# Patient Record
(demographics unavailable — no encounter records)

---

## 2025-05-21 NOTE — HISTORY OF PRESENT ILLNESS
[de-identified] : CC: This is a very pleasant 30-year-old male that presents to the office today as a new patient with right shoulder pain. Patient saw my colleague for this complaint in March 2025.  He was diagnosed with subacromial impingement of the right shoulder.  Advised to start physical therapy was prescribed meloxicam.  Has been taking meloxicam for the last couple of months until he ran out a week or 2 ago and noticed marked improvement.  He states that after he ran out he still noticed improvement and is about 65% better however is not fully recovered.  There is no inciting event or trauma.  He states that he was doing [employer] doing a lot of overhead activities but there was not 1 inciting event.  He has been doing the home exercise program he was provided.

## 2025-05-21 NOTE — PHYSICAL EXAM
[de-identified] : XR of Date: [default] Indication: [default] Views: [3-AP, scapular Y, axillary]  Performed at [facility]: [Yes]  Impression: 3 views of the right shoulder were obtained today and showed no fracture, or dislocation.  Humeral head is appropriately centered without subluxation or superior migration. There is well-preserved glenohumeral and AC joint space.  No calcific tendinosis.   The radiographs discussed were ordered and read by me during this patient's visit.  I reviewed each radiograph detail with the patient discussed the findings highlighted in the Impression.  [de-identified] : RIGHT SHOULDER   INSPECTION Appearance: No overlying skin changes, warmth, swelling, or muscular atrophy  PALPATION-TENDERNESS Anterior Capsule: None Posterior Capsule: None AC Joint/Clavicle: None Bicipital groove: None Supraspinatus: None Infraspinatus: None Teres Minor: None LH Biceps: None  ROM Active Flexion: Full-170 degrees Passive Flexion: Full-170 degrees Abduction: Full-180 degrees Active External Rotation: Full-70 degrees Passive External Rotation: Full-70 degrees Internal Rotation: Full  STRENGTH Flexion: 5/5 Abduction: 5/5 External rotation (infraspinatus): 5/5 Internal rotation: 5/5  SENSATION Intact  SPECIAL TESTING Painful arc: Negative Horan-Brent (impingement): Negative Neer's (impingement): Positive Empty can/Zacarias's/Painful arc (supraspinatus): Negative Speed's Test (biceps/labrum): Negative Sinai's (biceps/labrum): Negative Yergason's (biceps): Negative Liftoff test (subscapularis): Negative Belly press (subscapularis): Negative Whipple (supraspinatus/superior labrum): Negative Apprehension test (anterior GH instability/internal impingement): Negative Relocation (anterior instability): Negative Hornblower (rotator): Negative Cross-body adduction test (AC joint): Negative Scapular winging: None  LEFT SHOULDER Inspection: No overlying skin changes, warmth, swelling, or muscular atrophy Tenderness: Default Nontender: Anterior/posterior capsule, AC joint/clavicle, bicipital groove, supraspinatus, infraspinatus, teres minor, long head biceps ROM: Full and painless in flexion, abduction, external rotation, internal rotation Strength: 5/5 strength in all planes Special testing: Negative Horan, Neer's, Jobes, speeds, O'Briens, Yergason's, liftoff, belly press, Whipple, apprehension, relocation, Hornblower, cross body, scapular winging

## 2025-05-21 NOTE — DISCUSSION/SUMMARY
[de-identified] : ASSESSMENT Discussed findings of today's exam and possible cause of patient's pain.  Educated patient on their most probable diagnosis of subacromial impingement of the right shoulder.  I explained that subacromial impingement is most common cause of shoulder pain which occurs as a result of compression of the rotator cuff muscles by structures such as the AC joint, acromion, and CA ligament which leads to inflammation and the development of bursitis.  Risk factors include repetitive activity at or above the shoulder during work or sports, instability of the glenohumeral joint, scapular instability and scapular dyskinesis in addition to glenohumeral joint laxity.  Symptoms typically include pain with overhead activity which can be localized to the deltoid or lateral arm and often occurs at night when lying on the affected shoulder.  We reviewed possible courses of treatment, and we collaboratively decided best course of treatment at this time includes the following:  PLAN 1. Procedures: We discussed what a subacromial corticosteroid injection entails.  I explained that the patient is 65% better and typically would like to avoid this if possible in a young healthy patient.  If there is no continued improvement at follow-up visit we will consider this. 2. Physical Therapy: Advised to continue the HEP that was provided to the patient.  Offered supervised physical therapy however patient states he will be compliant with home exercise program and increase the frequency and duration of the exercises. 3. Medications: Discussed the appropriate length of time for NSAIDs 4. Imaging: None at this time.  If no continued improvement, consider XR/MRI of the shoulder. 5. Labs: None 6. Orthopedic Supplies: None 7. Activity/Restrictions: Patient was counseled regarding activity/activity modification.  Activities as tolerated, avoiding any painful activities with only slow gradual advances as tolerated and once ready.  Advised to pay close attention on whether there is any pain during and/or after the activity, in which case if this occurs, the patient should back off on the activity. 8. Referrals: None 9. Follow-up: 1 month  Patient appreciates and agrees with current plan.  This note was generated using Dragon medical dictation software.  A reasonable effort has been made for proofreading its contents, but typos may still remain.  If there are any questions or points of clarification needed, please notify my office.  This office visit included some or all of the following of both face-to-face time (preparation for visit-reviewing prior notes, performing H&P, ordering of medications, tests/ performing procedures, and counseling/education to the patient/family) and non-face-to-face time (deciding on recommendations to patients, independently interpreting tests, documentation in the EMR, communicating with other providers before or during the visit).    I have spent a total time of 45 minutes on this patient encounter on the same day of 5/20/2025.  Misael Trujillo MD, CAQSM

## 2025-05-21 NOTE — PHYSICAL EXAM
[de-identified] : XR of Date: [default] Indication: [default] Views: [3-AP, scapular Y, axillary]  Performed at [facility]: [Yes]  Impression: 3 views of the right shoulder were obtained today and showed no fracture, or dislocation.  Humeral head is appropriately centered without subluxation or superior migration. There is well-preserved glenohumeral and AC joint space.  No calcific tendinosis.   The radiographs discussed were ordered and read by me during this patient's visit.  I reviewed each radiograph detail with the patient discussed the findings highlighted in the Impression.  [de-identified] : RIGHT SHOULDER   INSPECTION Appearance: No overlying skin changes, warmth, swelling, or muscular atrophy  PALPATION-TENDERNESS Anterior Capsule: None Posterior Capsule: None AC Joint/Clavicle: None Bicipital groove: None Supraspinatus: None Infraspinatus: None Teres Minor: None LH Biceps: None  ROM Active Flexion: Full-170 degrees Passive Flexion: Full-170 degrees Abduction: Full-180 degrees Active External Rotation: Full-70 degrees Passive External Rotation: Full-70 degrees Internal Rotation: Full  STRENGTH Flexion: 5/5 Abduction: 5/5 External rotation (infraspinatus): 5/5 Internal rotation: 5/5  SENSATION Intact  SPECIAL TESTING Painful arc: Negative Horan-Brent (impingement): Negative Neer's (impingement): Positive Empty can/Zacarias's/Painful arc (supraspinatus): Negative Speed's Test (biceps/labrum): Negative Savannah's (biceps/labrum): Negative Yergason's (biceps): Negative Liftoff test (subscapularis): Negative Belly press (subscapularis): Negative Whipple (supraspinatus/superior labrum): Negative Apprehension test (anterior GH instability/internal impingement): Negative Relocation (anterior instability): Negative Hornblower (rotator): Negative Cross-body adduction test (AC joint): Negative Scapular winging: None  LEFT SHOULDER Inspection: No overlying skin changes, warmth, swelling, or muscular atrophy Tenderness: Default Nontender: Anterior/posterior capsule, AC joint/clavicle, bicipital groove, supraspinatus, infraspinatus, teres minor, long head biceps ROM: Full and painless in flexion, abduction, external rotation, internal rotation Strength: 5/5 strength in all planes Special testing: Negative Horan, Neer's, Jobes, speeds, O'Briens, Yergason's, liftoff, belly press, Whipple, apprehension, relocation, Hornblower, cross body, scapular winging

## 2025-05-21 NOTE — HISTORY OF PRESENT ILLNESS
[de-identified] : CC: This is a very pleasant 30-year-old male that presents to the office today as a new patient with right shoulder pain. Patient saw my colleague for this complaint in March 2025.  He was diagnosed with subacromial impingement of the right shoulder.  Advised to start physical therapy was prescribed meloxicam.  Has been taking meloxicam for the last couple of months until he ran out a week or 2 ago and noticed marked improvement.  He states that after he ran out he still noticed improvement and is about 65% better however is not fully recovered.  There is no inciting event or trauma.  He states that he was doing [employer] doing a lot of overhead activities but there was not 1 inciting event.  He has been doing the home exercise program he was provided.

## 2025-05-21 NOTE — DISCUSSION/SUMMARY
[de-identified] : ASSESSMENT Discussed findings of today's exam and possible cause of patient's pain.  Educated patient on their most probable diagnosis of subacromial impingement of the right shoulder.  I explained that subacromial impingement is most common cause of shoulder pain which occurs as a result of compression of the rotator cuff muscles by structures such as the AC joint, acromion, and CA ligament which leads to inflammation and the development of bursitis.  Risk factors include repetitive activity at or above the shoulder during work or sports, instability of the glenohumeral joint, scapular instability and scapular dyskinesis in addition to glenohumeral joint laxity.  Symptoms typically include pain with overhead activity which can be localized to the deltoid or lateral arm and often occurs at night when lying on the affected shoulder.  We reviewed possible courses of treatment, and we collaboratively decided best course of treatment at this time includes the following:  PLAN 1. Procedures: We discussed what a subacromial corticosteroid injection entails.  I explained that the patient is 65% better and typically would like to avoid this if possible in a young healthy patient.  If there is no continued improvement at follow-up visit we will consider this. 2. Physical Therapy: Advised to continue the HEP that was provided to the patient.  Offered supervised physical therapy however patient states he will be compliant with home exercise program and increase the frequency and duration of the exercises. 3. Medications: Discussed the appropriate length of time for NSAIDs 4. Imaging: None at this time.  If no continued improvement, consider XR/MRI of the shoulder. 5. Labs: None 6. Orthopedic Supplies: None 7. Activity/Restrictions: Patient was counseled regarding activity/activity modification.  Activities as tolerated, avoiding any painful activities with only slow gradual advances as tolerated and once ready.  Advised to pay close attention on whether there is any pain during and/or after the activity, in which case if this occurs, the patient should back off on the activity. 8. Referrals: None 9. Follow-up: 1 month  Patient appreciates and agrees with current plan.  This note was generated using Dragon medical dictation software.  A reasonable effort has been made for proofreading its contents, but typos may still remain.  If there are any questions or points of clarification needed, please notify my office.  This office visit included some or all of the following of both face-to-face time (preparation for visit-reviewing prior notes, performing H&P, ordering of medications, tests/ performing procedures, and counseling/education to the patient/family) and non-face-to-face time (deciding on recommendations to patients, independently interpreting tests, documentation in the EMR, communicating with other providers before or during the visit).    I have spent a total time of 45 minutes on this patient encounter on the same day of 5/20/2025.  Misael Trujillo MD, CAQSM